# Patient Record
Sex: FEMALE | Race: WHITE | NOT HISPANIC OR LATINO | URBAN - METROPOLITAN AREA
[De-identification: names, ages, dates, MRNs, and addresses within clinical notes are randomized per-mention and may not be internally consistent; named-entity substitution may affect disease eponyms.]

---

## 2022-06-24 ENCOUNTER — EMERGENCY (EMERGENCY)
Facility: HOSPITAL | Age: 22
LOS: 1 days | Discharge: HOME | End: 2022-06-24
Attending: EMERGENCY MEDICINE | Admitting: EMERGENCY MEDICINE
Payer: COMMERCIAL

## 2022-06-24 VITALS
OXYGEN SATURATION: 100 % | HEIGHT: 65 IN | TEMPERATURE: 100 F | HEART RATE: 94 BPM | RESPIRATION RATE: 20 BRPM | DIASTOLIC BLOOD PRESSURE: 62 MMHG | WEIGHT: 130.95 LBS | SYSTOLIC BLOOD PRESSURE: 128 MMHG

## 2022-06-24 VITALS
RESPIRATION RATE: 16 BRPM | TEMPERATURE: 98 F | HEART RATE: 72 BPM | SYSTOLIC BLOOD PRESSURE: 136 MMHG | OXYGEN SATURATION: 98 % | DIASTOLIC BLOOD PRESSURE: 61 MMHG

## 2022-06-24 LAB
ALBUMIN SERPL ELPH-MCNC: 4.2 G/DL — SIGNIFICANT CHANGE UP (ref 3.3–5)
ALP SERPL-CCNC: 75 U/L — SIGNIFICANT CHANGE UP (ref 30–120)
ALT FLD-CCNC: 13 U/L DA — SIGNIFICANT CHANGE UP (ref 10–60)
ANION GAP SERPL CALC-SCNC: 8 MMOL/L — SIGNIFICANT CHANGE UP (ref 5–17)
APPEARANCE UR: CLEAR — SIGNIFICANT CHANGE UP
AST SERPL-CCNC: 11 U/L — SIGNIFICANT CHANGE UP (ref 10–40)
BACTERIA # UR AUTO: ABNORMAL
BASOPHILS # BLD AUTO: 0.06 K/UL — SIGNIFICANT CHANGE UP (ref 0–0.2)
BASOPHILS NFR BLD AUTO: 0.5 % — SIGNIFICANT CHANGE UP (ref 0–2)
BILIRUB SERPL-MCNC: 1.6 MG/DL — HIGH (ref 0.2–1.2)
BILIRUB UR-MCNC: NEGATIVE — SIGNIFICANT CHANGE UP
BUN SERPL-MCNC: 12 MG/DL — SIGNIFICANT CHANGE UP (ref 7–23)
CALCIUM SERPL-MCNC: 9 MG/DL — SIGNIFICANT CHANGE UP (ref 8.4–10.5)
CHLORIDE SERPL-SCNC: 104 MMOL/L — SIGNIFICANT CHANGE UP (ref 96–108)
CO2 SERPL-SCNC: 25 MMOL/L — SIGNIFICANT CHANGE UP (ref 22–31)
COLOR SPEC: YELLOW — SIGNIFICANT CHANGE UP
CREAT SERPL-MCNC: 0.87 MG/DL — SIGNIFICANT CHANGE UP (ref 0.5–1.3)
DIFF PNL FLD: NEGATIVE — SIGNIFICANT CHANGE UP
EGFR: 97 ML/MIN/1.73M2 — SIGNIFICANT CHANGE UP
EOSINOPHIL # BLD AUTO: 0.08 K/UL — SIGNIFICANT CHANGE UP (ref 0–0.5)
EOSINOPHIL NFR BLD AUTO: 0.6 % — SIGNIFICANT CHANGE UP (ref 0–6)
EPI CELLS # UR: SIGNIFICANT CHANGE UP
FLUAV AG NPH QL: SIGNIFICANT CHANGE UP
FLUBV AG NPH QL: SIGNIFICANT CHANGE UP
GLUCOSE SERPL-MCNC: 104 MG/DL — HIGH (ref 70–99)
GLUCOSE UR QL: NEGATIVE MG/DL — SIGNIFICANT CHANGE UP
HCG UR QL: NEGATIVE — SIGNIFICANT CHANGE UP
HCT VFR BLD CALC: 40.3 % — SIGNIFICANT CHANGE UP (ref 34.5–45)
HGB BLD-MCNC: 13.4 G/DL — SIGNIFICANT CHANGE UP (ref 11.5–15.5)
IMM GRANULOCYTES NFR BLD AUTO: 0.4 % — SIGNIFICANT CHANGE UP (ref 0–1.5)
KETONES UR-MCNC: NEGATIVE — SIGNIFICANT CHANGE UP
LEUKOCYTE ESTERASE UR-ACNC: ABNORMAL
LIDOCAIN IGE QN: 126 U/L — SIGNIFICANT CHANGE UP (ref 73–393)
LYMPHOCYTES # BLD AUTO: 0.86 K/UL — LOW (ref 1–3.3)
LYMPHOCYTES # BLD AUTO: 6.5 % — LOW (ref 13–44)
MCHC RBC-ENTMCNC: 29.3 PG — SIGNIFICANT CHANGE UP (ref 27–34)
MCHC RBC-ENTMCNC: 33.3 GM/DL — SIGNIFICANT CHANGE UP (ref 32–36)
MCV RBC AUTO: 88.2 FL — SIGNIFICANT CHANGE UP (ref 80–100)
MONOCYTES # BLD AUTO: 1.13 K/UL — HIGH (ref 0–0.9)
MONOCYTES NFR BLD AUTO: 8.5 % — SIGNIFICANT CHANGE UP (ref 2–14)
NEUTROPHILS # BLD AUTO: 11.11 K/UL — HIGH (ref 1.8–7.4)
NEUTROPHILS NFR BLD AUTO: 83.5 % — HIGH (ref 43–77)
NITRITE UR-MCNC: NEGATIVE — SIGNIFICANT CHANGE UP
NRBC # BLD: 0 /100 WBCS — SIGNIFICANT CHANGE UP (ref 0–0)
PH UR: 7 — SIGNIFICANT CHANGE UP (ref 5–8)
PLATELET # BLD AUTO: 176 K/UL — SIGNIFICANT CHANGE UP (ref 150–400)
POTASSIUM SERPL-MCNC: 4 MMOL/L — SIGNIFICANT CHANGE UP (ref 3.5–5.3)
POTASSIUM SERPL-SCNC: 4 MMOL/L — SIGNIFICANT CHANGE UP (ref 3.5–5.3)
PROT SERPL-MCNC: 7.3 G/DL — SIGNIFICANT CHANGE UP (ref 6–8.3)
PROT UR-MCNC: NEGATIVE MG/DL — SIGNIFICANT CHANGE UP
RBC # BLD: 4.57 M/UL — SIGNIFICANT CHANGE UP (ref 3.8–5.2)
RBC # FLD: 12.2 % — SIGNIFICANT CHANGE UP (ref 10.3–14.5)
RBC CASTS # UR COMP ASSIST: SIGNIFICANT CHANGE UP /HPF (ref 0–4)
RSV RNA NPH QL NAA+NON-PROBE: SIGNIFICANT CHANGE UP
SARS-COV-2 RNA SPEC QL NAA+PROBE: SIGNIFICANT CHANGE UP
SODIUM SERPL-SCNC: 137 MMOL/L — SIGNIFICANT CHANGE UP (ref 135–145)
SP GR SPEC: 1.01 — SIGNIFICANT CHANGE UP (ref 1.01–1.02)
UROBILINOGEN FLD QL: 1 MG/DL
WBC # BLD: 13.29 K/UL — HIGH (ref 3.8–10.5)
WBC # FLD AUTO: 13.29 K/UL — HIGH (ref 3.8–10.5)
WBC UR QL: SIGNIFICANT CHANGE UP

## 2022-06-24 PROCEDURE — 81025 URINE PREGNANCY TEST: CPT

## 2022-06-24 PROCEDURE — 74177 CT ABD & PELVIS W/CONTRAST: CPT | Mod: 26,MA

## 2022-06-24 PROCEDURE — 80053 COMPREHEN METABOLIC PANEL: CPT

## 2022-06-24 PROCEDURE — 99284 EMERGENCY DEPT VISIT MOD MDM: CPT | Mod: 25

## 2022-06-24 PROCEDURE — 99285 EMERGENCY DEPT VISIT HI MDM: CPT

## 2022-06-24 PROCEDURE — 81001 URINALYSIS AUTO W/SCOPE: CPT

## 2022-06-24 PROCEDURE — 83690 ASSAY OF LIPASE: CPT

## 2022-06-24 PROCEDURE — 74177 CT ABD & PELVIS W/CONTRAST: CPT | Mod: MA

## 2022-06-24 PROCEDURE — 36415 COLL VENOUS BLD VENIPUNCTURE: CPT

## 2022-06-24 PROCEDURE — 96374 THER/PROPH/DIAG INJ IV PUSH: CPT | Mod: XU

## 2022-06-24 PROCEDURE — 85025 COMPLETE CBC W/AUTO DIFF WBC: CPT

## 2022-06-24 PROCEDURE — 87086 URINE CULTURE/COLONY COUNT: CPT

## 2022-06-24 PROCEDURE — 76830 TRANSVAGINAL US NON-OB: CPT | Mod: 26

## 2022-06-24 PROCEDURE — 87637 SARSCOV2&INF A&B&RSV AMP PRB: CPT

## 2022-06-24 PROCEDURE — 96361 HYDRATE IV INFUSION ADD-ON: CPT

## 2022-06-24 PROCEDURE — 76830 TRANSVAGINAL US NON-OB: CPT

## 2022-06-24 RX ORDER — IBUPROFEN 200 MG
1 TABLET ORAL
Qty: 30 | Refills: 0
Start: 2022-06-24 | End: 2022-07-03

## 2022-06-24 RX ORDER — SODIUM CHLORIDE 9 MG/ML
1000 INJECTION INTRAMUSCULAR; INTRAVENOUS; SUBCUTANEOUS ONCE
Refills: 0 | Status: COMPLETED | OUTPATIENT
Start: 2022-06-24 | End: 2022-06-24

## 2022-06-24 RX ORDER — LIDOCAINE 4 G/100G
1 CREAM TOPICAL ONCE
Refills: 0 | Status: COMPLETED | OUTPATIENT
Start: 2022-06-24 | End: 2022-06-24

## 2022-06-24 RX ORDER — CEFUROXIME AXETIL 250 MG
1 TABLET ORAL
Qty: 14 | Refills: 0
Start: 2022-06-24 | End: 2022-06-30

## 2022-06-24 RX ORDER — KETOROLAC TROMETHAMINE 30 MG/ML
30 SYRINGE (ML) INJECTION ONCE
Refills: 0 | Status: DISCONTINUED | OUTPATIENT
Start: 2022-06-24 | End: 2022-06-24

## 2022-06-24 RX ORDER — CEFUROXIME AXETIL 250 MG
500 TABLET ORAL ONCE
Refills: 0 | Status: COMPLETED | OUTPATIENT
Start: 2022-06-24 | End: 2022-06-24

## 2022-06-24 RX ADMIN — SODIUM CHLORIDE 1000 MILLILITER(S): 9 INJECTION INTRAMUSCULAR; INTRAVENOUS; SUBCUTANEOUS at 21:00

## 2022-06-24 RX ADMIN — SODIUM CHLORIDE 1000 MILLILITER(S): 9 INJECTION INTRAMUSCULAR; INTRAVENOUS; SUBCUTANEOUS at 22:15

## 2022-06-24 RX ADMIN — Medication 500 MILLIGRAM(S): at 22:23

## 2022-06-24 RX ADMIN — Medication 30 MILLIGRAM(S): at 21:40

## 2022-06-24 RX ADMIN — Medication 30 MILLIGRAM(S): at 22:00

## 2022-06-24 RX ADMIN — LIDOCAINE 1 PATCH: 4 CREAM TOPICAL at 22:23

## 2022-06-24 NOTE — ED ADULT TRIAGE NOTE - CHIEF COMPLAINT QUOTE
Sent from [facility] w/ c/o back pain x5 days and pelvic pain since this a.m. Took Tylenol w/out relief. Also c/o burning on urination. sudden onset

## 2022-06-24 NOTE — ED ADULT NURSE NOTE - NS ED NURSE RECORD ANOTHER HT AND WT
Yes Erivedge Counseling- I discussed with the patient the risks of Erivedge including but not limited to nausea, vomiting, diarrhea, constipation, weight loss, changes in the sense of taste, decreased appetite, muscle spasms, and hair loss.  The patient verbalized understanding of the proper use and possible adverse effects of Erivedge.  All of the patient's questions and concerns were addressed.

## 2022-06-24 NOTE — ED ADULT NURSE NOTE - CAS EDN INTEG ASSESS
If you are a smoker, it is important for your health to stop smoking. Please be aware that second hand smoke is also harmful. - - -

## 2022-06-24 NOTE — ED PROVIDER NOTE - NSFOLLOWUPINSTRUCTIONS_ED_ALL_ED_FT
motrin every 8 hours for pain  SalanPas lidocaine patches over the counter, 12 hours on 12 hours off  miralax over the counter for constipation   ceftin twice a day for 1 week       Ovarian Cyst       An ovarian cyst is a fluid-filled sac on an ovary. Most of these cysts go away on their own and are not cancer. Some cysts need treatment.      What are the causes?    •Ovarian hyperstimulation syndrome. Some medicines may lead to this problem.      •Polycystic ovarian syndrome (PCOS). Problems with body chemicals (hormones) can lead to this condition.      •The normal menstrual cycle.        What increases the risk?    •Being overweight or very overweight.      •Taking medicines to increase your chance of getting pregnant.      •Using some types of birth control.      •Smoking.        What are the signs or symptoms?    Many ovarian cysts do not cause symptoms. If you get symptoms, you may have:  •Pain or pressure in the area between the hip bones.      •Pain in the lower belly.      •Pain during sex.      •Swelling in the lower belly.      •Periods that are not regular.      •Pain with periods.        How is this treated?    Many ovarian cysts go away on their own without treatment. If you need treatment, it may include:  •Medicines for pain.      •Fluid taken out of the cyst.      •The cyst being taken out.      •Birth control pills or other medicines.      •Surgery to remove the ovary.        Follow these instructions at home:    •Take over-the-counter and prescription medicines only as told by your doctor.      •Ask your doctor if you should avoid driving or using machines while you are taking your medicine.      •Get exams and Pap tests as told by your doctor.      •Return to your normal activities when your doctor says that it is safe.      • Do not smoke or use any products that contain nicotine or tobacco. If you need help quitting, ask your doctor.      •Keep all follow-up visits.        Contact a doctor if:  •Your periods:  •Are late.      •Are not regular.      •Stop.      •Are painful.        •You have pain in the area between your hip bones, and the pain does not go away.      •You feel pressure on your bladder.      •You have trouble peeing.      •You feel full, or your belly hurts, swells, or bloats.      •You gain or lose weight without trying, or you are less hungry than normal.      •You feel pain and pressure in your back.      •You feel pain and pressure in the area between your hip bones.      •You think you may be pregnant.        Get help right away if:    •You have pain in your belly that is very bad or gets worse.      •You have pain in the area between your hip bones, and the pain is very bad or gets worse.      •You cannot eat or drink without vomiting.      •You get a fever or chills all of a sudden.      •Your period is a lot heavier than usual.        Summary    •An ovarian cyst is a fluid-filled sac on an ovary.      •Some cysts may cause problems and need treatment.      •Most of these cysts go away on their own.      This information is not intended to replace advice given to you by your health care provider. Make sure you discuss any questions you have with your health care provider.

## 2022-06-24 NOTE — ED PROVIDER NOTE - CARE PLAN
1 Principal Discharge DX:	Ovarian cyst  Secondary Diagnosis:	Dysuria  Secondary Diagnosis:	Constipation

## 2022-06-24 NOTE — ED PROVIDER NOTE - PATIENT PORTAL LINK FT
You can access the FollowMyHealth Patient Portal offered by [facility] by registering at the following website: [email or web address]. By joining [facility]’s FollowMyHealth portal, you will also be able to view your health information using other applications (apps) compatible with our system.

## 2022-06-24 NOTE — ED PROVIDER NOTE - OBJECTIVE STATEMENT
otherwise healthy 21 year old female presents with back and pelvic pain. has noticed some back pain otherwise healthy 21 year old female presents with back and pelvic pain. has noticed some back pain (more lower center aspect) about 5 days ago. feels crampy in nature. not same quality and character as cramping she has had in the past. today started to have pelvic pain and cramping. no n/v/d. no fevers, but felt some chills and sweating today. no cough or shortness of breath. slight dysuria but no urgency or freq. seen at urgent care [facility] and had tenderness to left pelvic region so sent to ED for further eval. no previous abd surgeries   PCP Yudy Peterson (in [place])

## 2022-06-24 NOTE — ED ADULT NURSE NOTE - CHIEF COMPLAINT QUOTE
Sent from [facility] w/ c/o back pain x5 days and pelvic pain since this a.m. Took Tylenol w/out relief. Also c/o burning on urination.

## 2022-06-24 NOTE — ED PROVIDER NOTE - CLINICAL SUMMARY MEDICAL DECISION MAKING FREE TEXT BOX
20 yo female no sig pmhx c/o back and pelvic pain, x 5 days with some dysuria.  No n/v/d.  low grade fever here. sent from urgent care for further evaluation.  Has hx of UTI.  pt well appearing, no acute distress, cta b/l, rrr, abd soft, mild ttp llq and rlq, no CVAT b/l.  UA shows some bacteria, elevated wbc 13k, negative CT a/p, US showing trace pelvic free fluid, possible ruptured cyst?, f/u with GYN, PO abx

## 2022-06-24 NOTE — ED PROVIDER NOTE - GASTROINTESTINAL, MLM
Abdomen soft, mild tenderness to left pelvic region and mild tenderness to RLQ. no rebound or distention

## 2022-06-24 NOTE — ED PROVIDER NOTE - PROGRESS NOTE DETAILS
Reevaluated patient at bedside.  Patient feeling improved.  Discussed the results of all diagnostic testing in ED and copies of all reports given.  no evidence of torsion or appendicitis. recommend miralax over the counter for constipation. due to symptoms of dysuria, will tx with abx for presumed infection.  An opportunity to ask questions was given.  Discussed the importance of prompt, close medical follow-up.  Patient will return with any changes, concerns or persistent / worsening symptoms.  Understanding of all instructions verbalized.

## 2022-06-24 NOTE — ED PROVIDER NOTE - GASTROINTESTINAL NEGATIVE STATEMENT, MLM
lower back pain (center aspect), pelvic pain,  no bloating, no constipation, no diarrhea, no nausea and no vomiting.

## 2022-06-26 LAB
CULTURE RESULTS: SIGNIFICANT CHANGE UP
SPECIMEN SOURCE: SIGNIFICANT CHANGE UP

## 2024-06-16 NOTE — ED ADULT NURSE NOTE - NS ED NURSE LEVEL OF CONSCIOUSNESS ORIENTATION
Please use Pataday Allergy eye drops OTC per box instructions for itchy eyes.   Oriented - self; Oriented - place; Oriented - time